# Patient Record
Sex: FEMALE | Race: WHITE | URBAN - METROPOLITAN AREA
[De-identification: names, ages, dates, MRNs, and addresses within clinical notes are randomized per-mention and may not be internally consistent; named-entity substitution may affect disease eponyms.]

---

## 2017-02-01 ENCOUNTER — TELEPHONE (OUTPATIENT)
Dept: OBGYN CLINIC | Age: 82
End: 2017-02-01

## 2017-02-01 ENCOUNTER — OFFICE VISIT (OUTPATIENT)
Dept: OBGYN CLINIC | Age: 82
End: 2017-02-01

## 2017-02-01 VITALS
SYSTOLIC BLOOD PRESSURE: 130 MMHG | RESPIRATION RATE: 18 BRPM | DIASTOLIC BLOOD PRESSURE: 70 MMHG | WEIGHT: 142 LBS | HEIGHT: 68 IN | BODY MASS INDEX: 21.52 KG/M2

## 2017-02-01 DIAGNOSIS — N82.3 FISTULA OF VAGINA TO LARGE INTESTINE: Primary | ICD-10-CM

## 2017-02-01 RX ORDER — TRAMADOL HYDROCHLORIDE 50 MG/1
50 TABLET ORAL
COMMUNITY

## 2017-02-01 RX ORDER — FAMOTIDINE 40 MG/1
40 TABLET, FILM COATED ORAL DAILY
COMMUNITY

## 2017-02-01 RX ORDER — LANOLIN ALCOHOL/MO/W.PET/CERES
1 CREAM (GRAM) TOPICAL DAILY
COMMUNITY

## 2017-02-01 RX ORDER — METRONIDAZOLE 500 MG/1
500 TABLET ORAL 3 TIMES DAILY
Qty: 30 TAB | Refills: 0 | Status: SHIPPED | OUTPATIENT
Start: 2017-02-01 | End: 2017-02-11

## 2017-02-01 RX ORDER — TRAZODONE HYDROCHLORIDE 100 MG/1
100 TABLET ORAL
COMMUNITY

## 2017-02-01 NOTE — PROGRESS NOTES
Vaginitis evaluation    Chief Complaint   Vaginitis (Discharge)      HPI  80 y.o. female complains of brown pussy vaginal discharge for 3 months. No LMP recorded. Patient has had a hysterectomy. She does have some odor and itching. Passing flatus per vagina also. S/P MAULIK/?BSO in 1960's. Had colonoscopy last spring. Had polyp but does not remember any diverticulosis. Was last here in 2014 when we treated her for left pelvic pain due to probable diverticulitis. Her pain resolved. No real pain or fever now. The patient denies aggravating factors. She is not concerned about possible STI exposure at this time. She denies exposure to new chemicals ot hygenic agents  Previous treatment included: none    Past Medical History   Diagnosis Date    Anxiety     Atrial fibrillation ([address]     Sleep apnea     Thyroid disease      Past Surgical History   Procedure Laterality Date    Hx heart valve surgery  2012    Hx pacemaker  04/10/14    Hx cholecystectomy      Hx orthopaedic       right knee surgery    Hx mohs procedure       left shoulder    Hx other surgical       throat surgery- titantium screws placed    Hx vein stripping       Social History     Occupational History    Not on file. Social History Main Topics    Smoking status: Never Smoker    Smokeless tobacco: Not on file    Alcohol use Yes      Comment: socially    Drug use: Not on file    Sexual activity: Not on file     Family History   Problem Relation Age of Onset    Cancer Mother      throat    Pneumonia Father     Pneumonia Daughter         Allergies   Allergen Reactions    Ceftin [Cefuroxime Axetil] Rash    Clindamycin Other (comments)     Blisters mouth    Tape [Adhesive] Rash    Victoza [Liraglutide] Rash     Prior to Admission medications    Medication Sig Start Date End Date Taking? Authorizing Provider   famotidine (PEPCID) 40 mg tablet Take 40 mg by mouth daily.    Yes Historical Provider   traMADol (ULTRAM) 50 mg tablet Take 50 mg by mouth every six (6) hours as needed for Pain. Yes Historical Provider   traZODone (DESYREL) 100 mg tablet Take 100 mg by mouth nightly. Yes Historical Provider   glucosamine-chondroitin (ARTHX) 500-400 mg cap Take 1 Cap by mouth daily. Yes Historical Provider   pantoprazole (PROTONIX) 40 mg tablet  6/2/14  Yes Historical Provider   carvedilol (COREG) 3.125 mg tablet  4/28/14  Yes Historical Provider   warfarin (COUMADIN) 5 mg tablet  6/3/14  Yes Historical Provider   levothyroxine (SYNTHROID) 25 mcg tablet  6/3/14  Yes Historical Provider   CALCIUM CARBONATE/VITAMIN D3 (CALCIUM 500 + D, D3, PO) Take  by mouth. Yes Historical Provider   CYANOCOBALAMIN, VITAMIN B-12, (VITAMIN B-12 PO) Take  by mouth. Yes Historical Provider   multivitamin (ONE A DAY) tablet Take 1 Tab by mouth daily. Yes Historical Provider   vitamin c-vitamin e (CRANBERRY CONCENTRATE) cap Take  by mouth. Yes Historical Provider   losartan (COZAAR) 25 mg tablet  6/2/14   Historical Provider   ALPRAZolam [place]) 0.25 mg tablet  5/17/14   Historical Provider   citalopram (CELEXA) 20 mg tablet  4/4/14   Historical Provider   bumetanide (BUMEX) 1 mg tablet  6/3/14   Historical Provider   rosuvastatin (CRESTOR) 20 mg tablet Take 20 mg by mouth nightly. Historical Provider   ACETAMINOPHEN (TYLENOL EXTRA STRENGTH PO) Take  by mouth.     Historical Provider                      Review of Systems - History obtained from the patient  Constitutional: negative for weight loss, fever, night sweats  Breast: negative for breast lumps, nipple discharge, galactorrhea  GI: negative for change in bowel habits, abdominal pain, black or bloody stools  : negative for frequency, dysuria, hematuria  MSK: negative for back pain, joint pain, muscle pain  Skin: negative for itching, rash, hives  Neuro: negative for dizziness, headache, confusion, weakness  Psych: negative for anxiety, depression, change in mood  Heme/lymph: negative for bleeding, bruising, pallor       Objective:    Visit Vitals    /70 (BP 1 Location: Right arm, BP Patient Position: Sitting)    Resp 18    Ht 5' 8\" (1.727 m)    Wt 142 lb (64.4 kg)    BMI 21.59 kg/m2       Physical Exam:   PHYSICAL EXAMINATION    Constitutional  · Appearance: well-nourished, well developed, alert, in no acute distress    HENT  · Head and Face: appears normal    Genitourinary  · External Genitalia: normal appearance for age, clear discharge present, no tenderness present, no inflammatory lesions present, no masses present, no atrophy present  · Vagina:  Brown discolored mild vaginal discharge present, vaginal vault is atrophic with a tender \"point\" at the left vaginal apex, no inflammatory lesions present, no masses present  · Bladder: non-tender to palpation  · Urethra: appears normal  · Cervix: absent   · Uterus: absent  · Adnexa: left adnexal tenderness present, no adnexal masses present  · Perineum: perineum within normal limits, no evidence of trauma, no rashes or skin lesions present  · Anus: anus within normal limits, no hemorrhoids present  · Inguinal Lymph Nodes: no lymphadenopathy present    Skin  · General Inspection: no rash, no lesions identified    Neurologic/Psychiatric  · Mental Status:  · Orientation: grossly oriented to person, place and time  · Mood and Affect: mood normal, affect appropriate      No results found for any visits on 02/01/17. Assessment:   Vaginal discharge and flatus due to small R-V fistula, most likely inflammatory. Cannot rule out malignancy on exam but doubtful with recent colonoscopy    Plan:   Refer to Dr. Julianna Aguero. Rx Flagyl 500 tid for 10 days. ROV prn if symptoms persist or worsen.

## 2017-02-01 NOTE — TELEPHONE ENCOUNTER
80year old patient last seen in the office today. Patient calling to say that she called Dr. Beatriz Caal office to make an appointment . Patient states she made an appointment with Dr. Iliana Hickey . Patient states she was advised that Dr. Beatriz Caal no longer does the surgery she needs. Patient states her appointment is 2/13/17. Patient wanted you to know.          FRANC

## 2017-02-01 NOTE — PATIENT INSTRUCTIONS
Pelvic Exam: Care Instructions  Your Care Instructions    When your doctor examines all of your pelvic organs, it's called a pelvic exam. Two good reasons to have this kind of exam are to check for sexually transmitted infections (STIs) and to get a Pap test. A Pap test is also called a Pap smear. It checks for early changes that can lead to cancer of the cervix. Sometimes a pelvic exam is part of a regular checkup. In this case, you can do some things to make your test results as accurate as possible. · Try to schedule the exam when you don't have your period. · Don't use douches, tampons, or vaginal medicines, sprays, or powders for 24 hours before your exam.  · Don't have sex for 24 hours before your exam.  Other times, women have this kind of exam at any time of the month. This is because they have pelvic pain, bleeding, or discharge. Or they may have another pelvic problem. Before your exam, it's important to share some information with your doctor. For example, if you are a survivor of rape or sexual abuse, you can talk about any concerns you may have. Your doctor will also want to know if you are pregnant or use birth control. And he or she will want to hear about any problems, surgeries, or procedures you have had in your pelvic area. You will also need to tell your doctor when your last period was. Follow-up care is a key part of your treatment and safety. Be sure to make and go to all appointments, and call your doctor if you are having problems. It's also a good idea to know your test results and keep a list of the medicines you take. How is a pelvic exam done? · During a pelvic exam, you will:  ¨ Take off your clothes below the waist. You will get a paper or cloth cover to put over the lower half of your body. [place] on your back on an exam table. Your feet will be raised above you. Stirrups will support your feet. · The doctor will:  Carina Witt you to relax your knees.  Your knees need to lean out, toward the walls. ¨ Check the opening of your vagina for sores or swelling. ¨ Gently put a tool called a speculum into your vagina. It opens the vagina a little bit. You will feel some pressure. But if you are relaxed, it will not hurt. It lets your doctor see inside the vagina. ¨ Use a small brush, spatula, or swab to get a sample of cells, if you are having a Pap test or culture. The doctor then removes the speculum. ¨ Put on gloves and put one or two fingers of one hand into your vagina. The other hand goes on your lower belly. This lets your doctor feel your pelvic organs. You will probably feel some pressure. Try to stay relaxed. ¨ Put one gloved finger into your rectum and one into your vagina, if needed. This can also help check your pelvic organs. This exam takes about 10 minutes. At the end, you will get a washcloth or tissue to clean your vaginal area. It's normal to have some discharge after this exam. You can then get dressed. Some test results may be ready right away. But results from a culture or a Pap test may take several days or a few weeks. Why should you have a pelvic exam?  · You want to have recommended screening tests. This includes a Pap test.  · You think you have a vaginal infection. Signs include itching, burning, or unusual discharge. · You might have been exposed to a sexually transmitted infection (STI), such as chlamydia or herpes. · You have vaginal bleeding that is not part of your normal menstrual period. · You have pain in your belly or pelvis. · You have been sexually assaulted. A pelvic exam lets your doctor collect evidence and check for STIs. · You are pregnant. · You are having trouble getting pregnant. What are the risks of a pelvic exam?  There are no risks from a pelvic exam.  When should you call for help?   Watch closely for changes in your health, and be sure to contact your doctor if:  · You have heavy bleeding or discharge from your vagina after the exam.  Where can you learn more? Go to http://lenore-rick.info/. Enter D103 in the search box to learn more about \"Pelvic Exam: Care Instructions. \"  Current as of: February 25, 2016  Content Version: 11.1  © 1960-5326 [employer]. Care instructions adapted under license by [employer] (which disclaims liability or warranty for this information). If you have questions about a medical condition or this instruction, always ask your healthcare professional. [place] [address] any warranty or liability for your use of this information.

## 2021-02-10 ENCOUNTER — EXTERNAL NURSING HOME DOCUMENTATION (OUTPATIENT)
Dept: INTERNAL MEDICINE CLINIC | Age: 86
End: 2021-02-10
Payer: MEDICARE

## 2021-02-10 DIAGNOSIS — Z86.16 HISTORY OF 2019 NOVEL CORONAVIRUS DISEASE (COVID-19): ICD-10-CM

## 2021-02-10 DIAGNOSIS — N30.00 ACUTE CYSTITIS WITHOUT HEMATURIA: Primary | ICD-10-CM

## 2021-02-10 DIAGNOSIS — I48.11 LONGSTANDING PERSISTENT ATRIAL FIBRILLATION (HCC): ICD-10-CM

## 2021-02-10 DIAGNOSIS — I50.22 SYSTOLIC CHF, CHRONIC (HCC): ICD-10-CM

## 2021-02-10 DIAGNOSIS — Z95.0 PACEMAKER: ICD-10-CM

## 2021-02-10 DIAGNOSIS — I69.359 CVA, OLD, HEMIPARESIS (HCC): ICD-10-CM

## 2021-02-10 DIAGNOSIS — Z95.1 HX OF CABG: ICD-10-CM

## 2021-02-10 DIAGNOSIS — I25.810 CORONARY ARTERY DISEASE INVOLVING CORONARY BYPASS GRAFT OF NATIVE HEART WITHOUT ANGINA PECTORIS: ICD-10-CM

## 2021-02-10 PROCEDURE — 99306 1ST NF CARE HIGH MDM 50: CPT | Performed by: INTERNAL MEDICINE

## 2021-02-12 ENCOUNTER — OFFICE VISIT (OUTPATIENT)
Dept: INTERNAL MEDICINE CLINIC | Age: 86
End: 2021-02-12
Payer: MEDICARE

## 2021-02-12 DIAGNOSIS — M62.81 MUSCLE WEAKNESS: ICD-10-CM

## 2021-02-12 DIAGNOSIS — I48.91 ATRIAL FIBRILLATION, UNSPECIFIED TYPE (HCC): ICD-10-CM

## 2021-02-12 DIAGNOSIS — Z86.73 HISTORY OF CVA (CEREBROVASCULAR ACCIDENT): ICD-10-CM

## 2021-02-12 DIAGNOSIS — F32.A DEPRESSION, UNSPECIFIED DEPRESSION TYPE: ICD-10-CM

## 2021-02-12 DIAGNOSIS — E03.9 HYPOTHYROIDISM, UNSPECIFIED TYPE: ICD-10-CM

## 2021-02-12 DIAGNOSIS — I70.90 ATHEROSCLEROSIS: Primary | ICD-10-CM

## 2021-02-12 DIAGNOSIS — I50.20 SYSTOLIC CONGESTIVE HEART FAILURE, UNSPECIFIED HF CHRONICITY (HCC): ICD-10-CM

## 2021-02-12 PROCEDURE — 99309 SBSQ NF CARE MODERATE MDM 30: CPT | Performed by: INTERNAL MEDICINE

## 2021-02-12 NOTE — PROGRESS NOTES
THIS IS NOT A COMPLETED MEDICAL RECORD ON THIS PATIENT. THIS IS A PATIENT OF [facility]   PLEASE CONTACT THE FACILITY BELOW FOR MORE INFORMATION ON THIS PATIENT   THE COMPLETE RECORD RESIDES WITH THIS LONG TERM CARE FACILITY    HISTORY OF PRESENT ILLNESS  Stacie Abdullahi is a 80 y.o. female. Patient is under the care of Dr. Brayan Perez at [facility]. Patient was recently admitted. Patient with a history of CVA, HF, depression, COVID, afib, UTI's, hypothyroidism, CAD. Patient was seen for a follow up. Patient was admitted with open areas t the buttocks and a bruise to the left hand. Medications were reviewed with the nurse. PO intake stable. HPI    Review of Systems   Unable to perform ROS: Dementia       Physical Exam  Vitals signs and nursing note reviewed. HENT:      Mouth/Throat:      Mouth: Mucous membranes are dry. Cardiovascular:      Rate and Rhythm: Normal rate. Rhythm irregular. Pulmonary:      Effort: Pulmonary effort is normal.      Breath sounds: Normal breath sounds. Abdominal:      General: Bowel sounds are normal.   Musculoskeletal: Normal range of motion. Skin:     General: Skin is dry. Neurological:      Mental Status: She is alert. Mental status is at baseline. ASSESSMENT and PLAN  Diagnoses and all orders for this visit:    1. Atherosclerosis    2. Hypothyroidism, unspecified type    3. Depression, unspecified depression type    4. Muscle weakness    5. History of CVA (cerebrovascular accident)    6. Atrial fibrillation, unspecified type ([address]    7. Systolic congestive heart failure, unspecified HF chronicity ([address]        PLAN:  1. Continue all meds  2. Encourage PO intake   3.  Wound care to assess buttocks     lab results and schedule of future lab studies reviewed with patient  reviewed medications and side effects in detail

## 2021-02-17 ENCOUNTER — OFFICE VISIT (OUTPATIENT)
Dept: INTERNAL MEDICINE CLINIC | Age: 86
End: 2021-02-17

## 2021-02-17 ENCOUNTER — EXTERNAL NURSING HOME DOCUMENTATION (OUTPATIENT)
Dept: INTERNAL MEDICINE CLINIC | Age: 86
End: 2021-02-17
Payer: MEDICARE

## 2021-02-17 DIAGNOSIS — Z95.0 PACEMAKER: ICD-10-CM

## 2021-02-17 DIAGNOSIS — I69.359 CVA, OLD, HEMIPARESIS (HCC): ICD-10-CM

## 2021-02-17 DIAGNOSIS — Z79.01 ANTICOAGULATED ON COUMADIN: ICD-10-CM

## 2021-02-17 DIAGNOSIS — I25.810 CORONARY ARTERY DISEASE INVOLVING CORONARY BYPASS GRAFT OF NATIVE HEART WITHOUT ANGINA PECTORIS: ICD-10-CM

## 2021-02-17 DIAGNOSIS — E46 PROTEIN MALNUTRITION (HCC): Primary | ICD-10-CM

## 2021-02-17 DIAGNOSIS — F32.A DEPRESSION, UNSPECIFIED DEPRESSION TYPE: ICD-10-CM

## 2021-02-17 DIAGNOSIS — E03.9 HYPOTHYROIDISM, UNSPECIFIED TYPE: ICD-10-CM

## 2021-02-17 DIAGNOSIS — Z86.16 HISTORY OF 2019 NOVEL CORONAVIRUS DISEASE (COVID-19): ICD-10-CM

## 2021-02-17 DIAGNOSIS — E63.9 INADEQUATE CALORIC INTAKE: Primary | ICD-10-CM

## 2021-02-17 DIAGNOSIS — Z86.73 HISTORY OF CVA (CEREBROVASCULAR ACCIDENT): ICD-10-CM

## 2021-02-17 DIAGNOSIS — I50.22 SYSTOLIC CHF, CHRONIC (HCC): ICD-10-CM

## 2021-02-17 DIAGNOSIS — R63.0 POOR APPETITE: ICD-10-CM

## 2021-02-17 DIAGNOSIS — I48.11 LONGSTANDING PERSISTENT ATRIAL FIBRILLATION (HCC): ICD-10-CM

## 2021-02-17 PROCEDURE — 99309 SBSQ NF CARE MODERATE MDM 30: CPT | Performed by: INTERNAL MEDICINE

## 2021-02-17 NOTE — PROGRESS NOTES
THIS IS NOT A COMPLETED MEDICAL RECORD ON THIS PATIENT. THIS IS A PATIENT OF [facility]  PLEASE CONTACT THE FACILITY BELOW FOR MORE INFORMATION ON THIS PATIENT   THE COMPLETE RECORD RESIDES WITH THIS LONG TERM CARE FACILITY    HISTORY OF PRESENT ILLNESS  Gisselle Knox is a 80 y.o. female. Patient is under the care of Dr. Clement Shelton at [facility]. Patient was recently admitted. Patient with a history of CVA, HF, depression, COVID, afib, UTI's, hypothyroidism, CAD. Saw patient after nursing reports that patient was not eating and was complaining of dull stomach pain. Daughter, per nursing reports that this can happen periodically. When speaking with the patient today, she reports that she is able to swallow and nothing in particular is bothering her. HPI    Review of Systems   Constitutional: Negative for fever. HENT: Negative for congestion. Respiratory: Negative for cough and shortness of breath. Cardiovascular: Negative for chest pain. Gastrointestinal: Negative for abdominal pain and vomiting. Musculoskeletal: Positive for back pain and joint pain. Neurological: Negative for headaches. Physical Exam  Vitals signs and nursing note reviewed. Constitutional:       General: She is not in acute distress. HENT:      Mouth/Throat:      Mouth: Mucous membranes are moist.      Pharynx: Oropharynx is clear. Cardiovascular:      Rate and Rhythm: Normal rate and regular rhythm. Pulmonary:      Effort: Pulmonary effort is normal.      Breath sounds: Normal breath sounds. Abdominal:      General: Bowel sounds are normal. There is no distension. Palpations: Abdomen is soft. Tenderness: There is no abdominal tenderness. Skin:     General: Skin is warm. Neurological:      Mental Status: She is alert. Mental status is at baseline. Psychiatric:      Comments: Answers simple questions          ASSESSMENT and PLAN  Diagnoses and all orders for this visit:    1. Inadequate caloric intake    2. Hypothyroidism, unspecified type    3. Depression, unspecified depression type    4. History of CVA (cerebrovascular accident)        PLAN:  1. SLP ordered to evaluate speech and swallowing  2. Started a purred diet with assistance   3.  KUB results pending       lab results and schedule of future lab studies reviewed with patient  reviewed medications and side effects in detail

## 2021-02-18 VITALS — RESPIRATION RATE: 14 BRPM

## 2021-02-18 PROBLEM — N30.00 ACUTE CYSTITIS WITHOUT HEMATURIA: Status: ACTIVE | Noted: 2021-02-18

## 2021-02-18 PROBLEM — I25.810 CORONARY ARTERY DISEASE INVOLVING CORONARY BYPASS GRAFT OF NATIVE HEART WITHOUT ANGINA PECTORIS: Status: ACTIVE | Noted: 2021-02-18

## 2021-02-18 PROBLEM — Z95.1 HX OF CABG: Status: ACTIVE | Noted: 2021-02-18

## 2021-02-18 PROBLEM — Z86.16 HISTORY OF 2019 NOVEL CORONAVIRUS DISEASE (COVID-19): Status: ACTIVE | Noted: 2021-02-18

## 2021-02-18 PROBLEM — I50.22 SYSTOLIC CHF, CHRONIC (HCC): Status: ACTIVE | Noted: 2021-02-18

## 2021-02-18 PROBLEM — I69.359 CVA, OLD, HEMIPARESIS (HCC): Status: ACTIVE | Noted: 2021-02-18

## 2021-02-18 PROBLEM — E46 PROTEIN MALNUTRITION (HCC): Status: ACTIVE | Noted: 2021-02-18

## 2021-02-18 PROBLEM — Z79.01 ANTICOAGULATED ON COUMADIN: Status: ACTIVE | Noted: 2021-02-18

## 2021-02-18 PROBLEM — I48.11 LONGSTANDING PERSISTENT ATRIAL FIBRILLATION (HCC): Status: ACTIVE | Noted: 2021-02-18

## 2021-02-18 PROBLEM — R63.0 POOR APPETITE: Status: ACTIVE | Noted: 2021-02-18

## 2021-02-18 PROBLEM — Z95.0 PACEMAKER: Status: ACTIVE | Noted: 2021-02-18

## 2021-02-18 NOTE — PROGRESS NOTES
THIS IS NOT A COMPLETE MEDICAL RECORD ON THIS PATIENT.    THIS IS A PATIENT AT [facility]  PLEASE CONTACT THE FACILITY LISTED BELOW FOR MORE INFORMATION ON THIS PATIENT.    THE COMPLETE RECORD RESIDES WITH THIS LONG TERM CARE FACILITY    HPI: Hang Jaquez is a 80-year-old white lady seen at [facility] for follow-up. Recent hospitalization for UTI and pneumonia. PMH includes CAD/CABG, CVA with right-sided weakness, CHF with EF of 45%, A. fib, pacemaker. According to staff appetite has been poor. Has complained about abdominal pain although tells me she is okay now. Has issues with constipation. X-ray of abdomen is negative. Coumadin has been on hold because of high INR. Labs from a few days ago show INR 3.0. Albumin 2.0. Other labs are relatively stable. Allergies: Ceftin, clindamycin, Victoza, tape  Meds: NH list reviewed    Exam:  Vital signs stable, afebrile  Elderly lady, in chair, answers simple questions properly, NAD  HEENT: Unremarkable  Neck: Supple, no JVD or bruits  Heart: Regular with distant sounds  Lungs: Diminished sounds, otherwise clear  Abdomen: Soft, nontender, normal bowel sounds  Extremities: No significant edema, DJD changes  Neuro:  right-sided weakness    Impression:   Poor appetite  Protein malnutrition  Recent Covid pneumonia  CAD with previous CABG  History of CVA with right hemiparesis  CHF  Atrial fibrillation on Coumadin  Pacemaker  Generalized weakness    Plan:  Continue current meds  Restart Coumadin 5 mg daily  Recheck INR in morning  Continue PT, OT, ST as tolerated  I called and spoke with patient's daughter. Daughter tells me patient likes certain type of foods. She plans to bring some food and protein supplements for her. I advised daughter to talk with the dietitian about patient's food preferences as well.   Findings and plan discussed with nurses  RAHUL Martin D.O.

## 2021-02-18 NOTE — PROGRESS NOTES
THIS IS NOT A COMPLETE MEDICAL RECORD ON THIS PATIENT.    THIS IS A PATIENT AT [facility] [address] LISTED BELOW FOR MORE INFORMATION ON THIS PATIENT.    THE COMPLETE RECORD RESIDES WITH THIS LONG TERM CARE FACILITY    HPI: Kian Slater is a 51-year-old white lady who was admitted to [facility]. Recent hospitalization for UTI and pneumonia. I reviewed records from the hospital.  Patient was treated with antibiotics. PMH includes CAD/CABG, CVA with right-sided weakness, CHF with EF of 45%, A. fib, pacemaker. Answers simple questions properly. Reports being weak. Denies headache, dizziness, chest pain, dyspnea. Her Coumadin was held in the hospital because of high INR while on antibiotics. Staff do not report any significant new problems at this point. PMH: See HPI  PSH: CABG  SH: No tobacco or alcohol, has a daughter in [place]  FH: Noncontributory  Allergies: Ceftin, clindamycin, Victoza, tape  Meds: NH list reviewed    Exam:  Vital signs stable, afebrile  Elderly lady, in bed, answers simple questions properly, NAD  HEENT: Unremarkable  Neck: Supple, no JVD or bruits  Heart: Regular with distant sounds  Lungs: Diminished sounds, otherwise clear  Abdomen: Soft, nontender, normal bowel sounds  Extremities: No significant edema, DJD changes  Neuro:  right-sided weakness    Impression:   UTI  Recent Covid pneumonia  CAD with previous CABG  History of CVA with right hemiparesis  CHF  Atrial fibrillation  Pacemaker  Generalized weakness    Plan:  Continue current meds  Baseline labs including INR  PT, OT, ST as tolerated  I called and spoke with patient's daughter about my findings and plan  DNR per daughter. Signed DNR form.     Marko Hernandez D.O.

## 2021-03-01 ENCOUNTER — OFFICE VISIT (OUTPATIENT)
Dept: INTERNAL MEDICINE CLINIC | Age: 86
End: 2021-03-01
Payer: MEDICARE

## 2021-03-01 DIAGNOSIS — Z79.01 ANTICOAGULATED ON COUMADIN: ICD-10-CM

## 2021-03-01 DIAGNOSIS — R23.3 ABNORMAL BRUISING: ICD-10-CM

## 2021-03-01 DIAGNOSIS — I25.810 CORONARY ARTERY DISEASE INVOLVING CORONARY BYPASS GRAFT OF NATIVE HEART WITHOUT ANGINA PECTORIS: Primary | ICD-10-CM

## 2021-03-01 DIAGNOSIS — I69.359 CVA, OLD, HEMIPARESIS (HCC): ICD-10-CM

## 2021-03-01 PROCEDURE — 99309 SBSQ NF CARE MODERATE MDM 30: CPT | Performed by: INTERNAL MEDICINE

## 2021-03-01 NOTE — PROGRESS NOTES
THIS IS NOT A COMPLETED MEDICAL RECORD ON THIS PATIENT. THIS IS A PATIENT OF [facility]  PLEASE CONTACT THE FACILITY BELOW FOR MORE INFORMATION ON THIS PATIENT   THE COMPLETE RECORD RESIDES WITH THIS LONG TERM CARE FACILITY    HISTORY OF PRESENT ILLNESS  Ced Pmientel is a 80 y.o. female. Patient is under the care of Dr. Kieran Duane at [facility]. Patient was recently admitted. Patient with a history of CVA, HF, depression, COVID, afib, UTI's, hypothyroidism, CAD. Saw patient after nursing reports that over the weekend she began to have right chest bruising that extended under her arm. INR was 8. It was placed on hold. Xray was negative for trauma. HPI    Review of Systems   Constitutional: Negative for fever. Respiratory: Negative for cough. Cardiovascular: Negative for chest pain. Gastrointestinal: Negative. Musculoskeletal: Positive for back pain and joint pain. Neurological: Negative. Endo/Heme/Allergies: Bruises/bleeds easily. Physical Exam  Vitals signs and nursing note reviewed. Constitutional:       General: She is not in acute distress. Eyes:      Pupils: Pupils are equal, round, and reactive to light. Cardiovascular:      Rate and Rhythm: Normal rate and regular rhythm. Abdominal:      General: Bowel sounds are normal.      Palpations: Abdomen is soft. Skin:     Findings: Bruising present. Neurological:      Mental Status: She is alert. Comments: Can answer simple questions          ASSESSMENT and PLAN  Diagnoses and all orders for this visit:    1. Coronary artery disease involving coronary bypass graft of native heart without angina pectoris    2. CVA, old, hemiparesis ([address]    3. Anticoagulated on Coumadin    4. Abnormal bruising        PLAN:  1. Continue to hold warfarin  2. STAT CBC and INR  3. Fall precautions   4.  Nursing to report changes to MD/NP      lab results and schedule of future lab studies reviewed with patient  reviewed medications and side effects in detail

## 2021-03-03 ENCOUNTER — EXTERNAL NURSING HOME DOCUMENTATION (OUTPATIENT)
Dept: INTERNAL MEDICINE CLINIC | Age: 86
End: 2021-03-03

## 2021-03-03 DIAGNOSIS — I48.91 ATRIAL FIBRILLATION, UNSPECIFIED TYPE (HCC): ICD-10-CM

## 2021-03-03 DIAGNOSIS — Z95.0 PACEMAKER: ICD-10-CM

## 2021-03-03 DIAGNOSIS — R63.0 POOR APPETITE: ICD-10-CM

## 2021-03-03 DIAGNOSIS — I69.359 CVA, OLD, HEMIPARESIS (HCC): ICD-10-CM

## 2021-03-03 DIAGNOSIS — E46 PROTEIN MALNUTRITION (HCC): Primary | ICD-10-CM

## 2021-03-03 DIAGNOSIS — I50.22 SYSTOLIC CHF, CHRONIC (HCC): ICD-10-CM

## 2021-03-10 ENCOUNTER — OFFICE VISIT (OUTPATIENT)
Dept: INTERNAL MEDICINE CLINIC | Age: 86
End: 2021-03-10
Payer: MEDICARE

## 2021-03-10 DIAGNOSIS — I50.22 SYSTOLIC CHF, CHRONIC (HCC): ICD-10-CM

## 2021-03-10 DIAGNOSIS — I48.11 LONGSTANDING PERSISTENT ATRIAL FIBRILLATION (HCC): ICD-10-CM

## 2021-03-10 DIAGNOSIS — Z86.16 HISTORY OF 2019 NOVEL CORONAVIRUS DISEASE (COVID-19): ICD-10-CM

## 2021-03-10 DIAGNOSIS — E46 PROTEIN MALNUTRITION (HCC): Primary | ICD-10-CM

## 2021-03-10 DIAGNOSIS — Z95.0 PACEMAKER: ICD-10-CM

## 2021-03-10 DIAGNOSIS — I69.359 CVA, OLD, HEMIPARESIS (HCC): ICD-10-CM

## 2021-03-10 PROCEDURE — 99308 SBSQ NF CARE LOW MDM 20: CPT | Performed by: INTERNAL MEDICINE

## 2021-04-19 VITALS — RESPIRATION RATE: 14 BRPM

## 2021-04-19 NOTE — PROGRESS NOTES
THIS IS NOT A COMPLETE MEDICAL RECORD ON THIS PATIENT.   
THIS IS A PATIENT AT [facility] PLEASE CONTACT THE FACILITY LISTED BELOW FOR MORE INFORMATION ON THIS PATIENT.   
THE COMPLETE RECORD RESIDES WITH THIS LONG TERM CARE FACILITY 
 
HPI: Candy Alpers is a 51-year-old white lady seen at [facility] for follow-up. PMH includes CAD/CABG, CVA with right-sided weakness, CHF with EF of 45%, A. fib, pacemaker. Appetite has been poor. Denies abdominal pain, nausea vomiting. Has issues with constipation. X-ray of abdomen has been negative. Depends on staff for many ADLs. Making slow progress with rehab. Allergies: Ceftin, clindamycin, Victoza, tape Meds: NH list reviewed Exam: 
Vital signs stable, afebrile Elderly lady, in chair, answers simple questions properly, NAD HEENT: Unremarkable Neck: Supple, no JVD or bruits Heart: Regular with distant sounds Lungs: Diminished sounds, otherwise clear Abdomen: Soft, nontender, normal bowel sounds Extremities: No significant edema, DJD changes Neuro:  right-sided weakness Impression:  
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 
Continue current meds Continue PT, OT, ST as tolerated I called and spoke with patient's daughter. Findings and plan discussed with nurses DNR Ling Kulkarni D.O.

## 2021-04-19 NOTE — PROGRESS NOTES
THIS IS NOT A COMPLETE MEDICAL RECORD ON THIS PATIENT.    THIS IS A PATIENT AT [facility]  PLEASE CONTACT THE FACILITY LISTED BELOW FOR MORE INFORMATION ON THIS PATIENT.    THE COMPLETE RECORD RESIDES WITH THIS LONG TERM CARE FACILITY    HPI: Ana Shaw is a 59-year-old white lady seen at [facility] for follow-up. PMH includes CAD/CABG, CVA with right-sided weakness, CHF with EF of 45%, A. fib, pacemaker. Appetite remains poor. According to daughter she is a picky eater. Daughter has been bringing her favorite foods. Patient denies abdominal pain, nausea vomiting. Has chronic issues with constipation. X-ray of abdomen has been negative. Depends on staff for many ADLs. Making slow progress with rehab. Staff no reports of any other acute issues. Allergies: Ceftin, clindamycin, Victoza, tape  Meds: NH list reviewed    Exam:  Vital signs stable, afebrile  Elderly lady, in chair, answers simple questions properly, NAD  HEENT: Unremarkable  Neck: Supple, no JVD or bruits  Heart: Regular with distant sounds  Lungs: Diminished sounds, otherwise clear  Abdomen: Soft, nontender, normal bowel sounds  Extremities: No significant edema, DJD changes  Neuro:  right-sided weakness    Impression:   Protein malnutrition  History of Covid pneumonia  CAD with previous CABG  History of CVA with right hemiparesis  CHF  Atrial fibrillation on Coumadin  Pacemaker  Generalized weakness    Plan:  Continue current meds  Continue PT, OT, ST as tolerated  I called and spoke with patient's daughter.    Findings and plan discussed with nurses  DNR    Randy Montalvo D.O.